# Patient Record
Sex: MALE | NOT HISPANIC OR LATINO | Employment: FULL TIME | URBAN - METROPOLITAN AREA
[De-identification: names, ages, dates, MRNs, and addresses within clinical notes are randomized per-mention and may not be internally consistent; named-entity substitution may affect disease eponyms.]

---

## 2024-07-18 ENCOUNTER — APPOINTMENT (OUTPATIENT)
Dept: CT IMAGING | Facility: CLINIC | Age: 64
DRG: 562 | End: 2024-07-18
Attending: EMERGENCY MEDICINE
Payer: COMMERCIAL

## 2024-07-18 ENCOUNTER — APPOINTMENT (OUTPATIENT)
Dept: GENERAL RADIOLOGY | Facility: CLINIC | Age: 64
DRG: 562 | End: 2024-07-18
Attending: EMERGENCY MEDICINE
Payer: COMMERCIAL

## 2024-07-18 ENCOUNTER — HOSPITAL ENCOUNTER (INPATIENT)
Facility: CLINIC | Age: 64
LOS: 1 days | Discharge: HOME | DRG: 562 | End: 2024-07-19
Attending: EMERGENCY MEDICINE | Admitting: SURGERY
Payer: COMMERCIAL

## 2024-07-18 DIAGNOSIS — S42.032A DISPLACED FRACTURE OF LATERAL END OF LEFT CLAVICLE, INITIAL ENCOUNTER FOR CLOSED FRACTURE: ICD-10-CM

## 2024-07-18 DIAGNOSIS — I60.9 SUBARACHNOID HEMORRHAGE (H): ICD-10-CM
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
## 2024-07-18 LAB
ABO/RH(D): NORMAL
ALBUMIN SERPL BCG-MCNC: 4.8 G/DL (ref 3.5–5.2)
ALP SERPL-CCNC: 87 U/L (ref 40–150)
ALT SERPL W P-5'-P-CCNC: 11 U/L (ref 0–70)
ANION GAP SERPL CALCULATED.3IONS-SCNC: 17 MMOL/L (ref 7–15)
ANTIBODY SCREEN: NEGATIVE
AST SERPL W P-5'-P-CCNC: 22 U/L (ref 0–45)
ATRIAL RATE - MUSE: 109 BPM
BASE EXCESS BLDV CALC-SCNC: -5 MMOL/L (ref -3–3)
BASOPHILS # BLD AUTO: 0.1 10E3/UL (ref 0–0.2)
BASOPHILS NFR BLD AUTO: 0 %
BILIRUB SERPL-MCNC: 0.4 MG/DL
BUN SERPL-MCNC: 9.1 MG/DL (ref 8–23)
CALCIUM SERPL-MCNC: 9.1 MG/DL (ref 8.8–10.4)
CHLORIDE SERPL-SCNC: 94 MMOL/L (ref 98–107)
CREAT SERPL-MCNC: 0.6 MG/DL (ref 0.67–1.17)
DIASTOLIC BLOOD PRESSURE - MUSE: NORMAL MMHG
EGFRCR SERPLBLD CKD-EPI 2021: >90 ML/MIN/1.73M2
EOSINOPHIL # BLD AUTO: 0.1 10E3/UL (ref 0–0.7)
EOSINOPHIL NFR BLD AUTO: 1 %
ERYTHROCYTE [DISTWIDTH] IN BLOOD BY AUTOMATED COUNT: 11.6 % (ref 10–15)
GLUCOSE SERPL-MCNC: 127 MG/DL (ref 70–99)
HCO3 BLDV-SCNC: 21 MMOL/L (ref 21–28)
HCO3 SERPL-SCNC: 22 MMOL/L (ref 22–29)
HCT VFR BLD AUTO: 40.2 % (ref 40–53)
HGB BLD-MCNC: 14.8 G/DL (ref 13.3–17.7)
IMM GRANULOCYTES # BLD: 0.1 10E3/UL
IMM GRANULOCYTES NFR BLD: 1 %
INR PPP: 0.96 (ref 0.85–1.15)
INTERPRETATION ECG - MUSE: NORMAL
LACTATE BLD-SCNC: 4.7 MMOL/L
LACTATE SERPL-SCNC: 1.4 MMOL/L (ref 0.7–2)
LACTATE SERPL-SCNC: 4.4 MMOL/L (ref 0.7–2)
LYMPHOCYTES # BLD AUTO: 2.6 10E3/UL (ref 0.8–5.3)
LYMPHOCYTES NFR BLD AUTO: 15 %
MCH RBC QN AUTO: 32.7 PG (ref 26.5–33)
MCHC RBC AUTO-ENTMCNC: 36.8 G/DL (ref 31.5–36.5)
MCV RBC AUTO: 89 FL (ref 78–100)
MONOCYTES # BLD AUTO: 0.9 10E3/UL (ref 0–1.3)
MONOCYTES NFR BLD AUTO: 5 %
NEUTROPHILS # BLD AUTO: 13.2 10E3/UL (ref 1.6–8.3)
NEUTROPHILS NFR BLD AUTO: 78 %
NRBC # BLD AUTO: 0 10E3/UL
NRBC BLD AUTO-RTO: 0 /100
P AXIS - MUSE: 54 DEGREES
PCO2 BLDV: 40 MM HG (ref 40–50)
PH BLDV: 7.32 [PH] (ref 7.32–7.43)
PLATELET # BLD AUTO: 241 10E3/UL (ref 150–450)
PO2 BLDV: 38 MM HG (ref 25–47)
POTASSIUM SERPL-SCNC: 3.3 MMOL/L (ref 3.4–5.3)
PR INTERVAL - MUSE: 148 MS
PROT SERPL-MCNC: 7.8 G/DL (ref 6.4–8.3)
QRS DURATION - MUSE: 84 MS
QT - MUSE: 340 MS
QTC - MUSE: 457 MS
R AXIS - MUSE: 61 DEGREES
RBC # BLD AUTO: 4.52 10E6/UL (ref 4.4–5.9)
SAO2 % BLDV: 68 % (ref 70–75)
SODIUM SERPL-SCNC: 133 MMOL/L (ref 135–145)
SPECIMEN EXPIRATION DATE: NORMAL
SYSTOLIC BLOOD PRESSURE - MUSE: NORMAL MMHG
T AXIS - MUSE: 46 DEGREES
VENTRICULAR RATE- MUSE: 109 BPM
WBC # BLD AUTO: 17 10E3/UL (ref 4–11)

## 2024-07-18 PROCEDURE — 99207 PR APP CREDIT; MD BILLING SHARED VISIT: CPT | Performed by: PHYSICIAN ASSISTANT

## 2024-07-18 PROCEDURE — 258N000003 HC RX IP 258 OP 636: Performed by: EMERGENCY MEDICINE

## 2024-07-18 PROCEDURE — 83605 ASSAY OF LACTIC ACID: CPT

## 2024-07-18 PROCEDURE — 72125 CT NECK SPINE W/O DYE: CPT

## 2024-07-18 PROCEDURE — 86900 BLOOD TYPING SEROLOGIC ABO: CPT | Performed by: EMERGENCY MEDICINE

## 2024-07-18 PROCEDURE — 120N000001 HC R&B MED SURG/OB

## 2024-07-18 PROCEDURE — 82803 BLOOD GASES ANY COMBINATION: CPT

## 2024-07-18 PROCEDURE — 85610 PROTHROMBIN TIME: CPT | Performed by: EMERGENCY MEDICINE

## 2024-07-18 PROCEDURE — 73060 X-RAY EXAM OF HUMERUS: CPT | Mod: LT

## 2024-07-18 PROCEDURE — 96360 HYDRATION IV INFUSION INIT: CPT | Mod: 59

## 2024-07-18 PROCEDURE — 73030 X-RAY EXAM OF SHOULDER: CPT | Mod: LT

## 2024-07-18 PROCEDURE — 36415 COLL VENOUS BLD VENIPUNCTURE: CPT

## 2024-07-18 PROCEDURE — 87040 BLOOD CULTURE FOR BACTERIA: CPT | Performed by: EMERGENCY MEDICINE

## 2024-07-18 PROCEDURE — 93005 ELECTROCARDIOGRAM TRACING: CPT

## 2024-07-18 PROCEDURE — 80053 COMPREHEN METABOLIC PANEL: CPT | Performed by: EMERGENCY MEDICINE

## 2024-07-18 PROCEDURE — 250N000011 HC RX IP 250 OP 636: Performed by: EMERGENCY MEDICINE

## 2024-07-18 PROCEDURE — 83605 ASSAY OF LACTIC ACID: CPT | Performed by: EMERGENCY MEDICINE

## 2024-07-18 PROCEDURE — 72128 CT CHEST SPINE W/O DYE: CPT

## 2024-07-18 PROCEDURE — 72131 CT LUMBAR SPINE W/O DYE: CPT

## 2024-07-18 PROCEDURE — 99285 EMERGENCY DEPT VISIT HI MDM: CPT | Mod: 25

## 2024-07-18 PROCEDURE — 85025 COMPLETE CBC W/AUTO DIFF WBC: CPT | Performed by: EMERGENCY MEDICINE

## 2024-07-18 PROCEDURE — 250N000009 HC RX 250: Performed by: EMERGENCY MEDICINE

## 2024-07-18 PROCEDURE — 96361 HYDRATE IV INFUSION ADD-ON: CPT

## 2024-07-18 PROCEDURE — 71260 CT THORAX DX C+: CPT

## 2024-07-18 PROCEDURE — 36415 COLL VENOUS BLD VENIPUNCTURE: CPT | Performed by: EMERGENCY MEDICINE

## 2024-07-18 PROCEDURE — 258N000003 HC RX IP 258 OP 636: Performed by: SURGERY

## 2024-07-18 PROCEDURE — 99222 1ST HOSP IP/OBS MODERATE 55: CPT | Mod: FS | Performed by: NEUROLOGICAL SURGERY

## 2024-07-18 PROCEDURE — 71046 X-RAY EXAM CHEST 2 VIEWS: CPT

## 2024-07-18 PROCEDURE — 70450 CT HEAD/BRAIN W/O DYE: CPT

## 2024-07-18 RX ORDER — ONDANSETRON 2 MG/ML
4 INJECTION INTRAMUSCULAR; INTRAVENOUS EVERY 6 HOURS PRN
Status: DISCONTINUED | OUTPATIENT
Start: 2024-07-18 | End: 2024-07-19 | Stop reason: HOSPADM

## 2024-07-18 RX ORDER — OXYCODONE HYDROCHLORIDE 5 MG/1
5 TABLET ORAL EVERY 4 HOURS PRN
Status: DISCONTINUED | OUTPATIENT
Start: 2024-07-18 | End: 2024-07-19 | Stop reason: HOSPADM

## 2024-07-18 RX ORDER — NALOXONE HYDROCHLORIDE 0.4 MG/ML
0.4 INJECTION, SOLUTION INTRAMUSCULAR; INTRAVENOUS; SUBCUTANEOUS
Status: DISCONTINUED | OUTPATIENT
Start: 2024-07-18 | End: 2024-07-19 | Stop reason: HOSPADM

## 2024-07-18 RX ORDER — GLIPIZIDE 10 MG/1
10 TABLET, FILM COATED, EXTENDED RELEASE ORAL DAILY
COMMUNITY

## 2024-07-18 RX ORDER — NALOXONE HYDROCHLORIDE 0.4 MG/ML
0.2 INJECTION, SOLUTION INTRAMUSCULAR; INTRAVENOUS; SUBCUTANEOUS
Status: DISCONTINUED | OUTPATIENT
Start: 2024-07-18 | End: 2024-07-19 | Stop reason: HOSPADM

## 2024-07-18 RX ORDER — OXYCODONE HYDROCHLORIDE 5 MG/1
10 TABLET ORAL EVERY 4 HOURS PRN
Status: DISCONTINUED | OUTPATIENT
Start: 2024-07-18 | End: 2024-07-19 | Stop reason: HOSPADM

## 2024-07-18 RX ORDER — LOSARTAN POTASSIUM 100 MG/1
100 TABLET ORAL DAILY
COMMUNITY

## 2024-07-18 RX ORDER — HYDROMORPHONE HCL IN WATER/PF 6 MG/30 ML
0.2 PATIENT CONTROLLED ANALGESIA SYRINGE INTRAVENOUS
Status: DISCONTINUED | OUTPATIENT
Start: 2024-07-18 | End: 2024-07-19 | Stop reason: HOSPADM

## 2024-07-18 RX ORDER — ACETAMINOPHEN 325 MG/1
650 TABLET ORAL EVERY 4 HOURS PRN
Status: DISCONTINUED | OUTPATIENT
Start: 2024-07-18 | End: 2024-07-19 | Stop reason: HOSPADM

## 2024-07-18 RX ORDER — ONDANSETRON 4 MG/1
4 TABLET, ORALLY DISINTEGRATING ORAL EVERY 6 HOURS PRN
Status: DISCONTINUED | OUTPATIENT
Start: 2024-07-18 | End: 2024-07-19 | Stop reason: HOSPADM

## 2024-07-18 RX ORDER — POLYETHYLENE GLYCOL 3350 17 G/17G
17 POWDER, FOR SOLUTION ORAL DAILY PRN
Status: DISCONTINUED | OUTPATIENT
Start: 2024-07-18 | End: 2024-07-19 | Stop reason: HOSPADM

## 2024-07-18 RX ORDER — IOPAMIDOL 755 MG/ML
73 INJECTION, SOLUTION INTRAVASCULAR ONCE
Status: COMPLETED | OUTPATIENT
Start: 2024-07-18 | End: 2024-07-18

## 2024-07-18 RX ORDER — AMLODIPINE BESYLATE 10 MG/1
10 TABLET ORAL DAILY
COMMUNITY

## 2024-07-18 RX ORDER — SODIUM CHLORIDE 9 MG/ML
1000 INJECTION, SOLUTION INTRAVENOUS CONTINUOUS
Status: DISCONTINUED | OUTPATIENT
Start: 2024-07-18 | End: 2024-07-19 | Stop reason: HOSPADM

## 2024-07-18 RX ORDER — HYDROMORPHONE HCL IN WATER/PF 6 MG/30 ML
0.4 PATIENT CONTROLLED ANALGESIA SYRINGE INTRAVENOUS
Status: DISCONTINUED | OUTPATIENT
Start: 2024-07-18 | End: 2024-07-19 | Stop reason: HOSPADM

## 2024-07-18 RX ORDER — AMOXICILLIN 250 MG
1-2 CAPSULE ORAL 2 TIMES DAILY
Status: DISCONTINUED | OUTPATIENT
Start: 2024-07-18 | End: 2024-07-19 | Stop reason: HOSPADM

## 2024-07-18 RX ORDER — ATORVASTATIN CALCIUM 20 MG/1
20 TABLET, FILM COATED ORAL DAILY
COMMUNITY

## 2024-07-18 RX ADMIN — SODIUM CHLORIDE 62 ML: 9 INJECTION, SOLUTION INTRAVENOUS at 18:22

## 2024-07-18 RX ADMIN — SODIUM CHLORIDE 1000 ML: 9 INJECTION, SOLUTION INTRAVENOUS at 17:07

## 2024-07-18 RX ADMIN — IOPAMIDOL 73 ML: 755 INJECTION, SOLUTION INTRAVENOUS at 18:22

## 2024-07-18 RX ADMIN — SODIUM CHLORIDE 1000 ML: 9 INJECTION, SOLUTION INTRAVENOUS at 18:47

## 2024-07-18 ASSESSMENT — ACTIVITIES OF DAILY LIVING (ADL)
ADLS_ACUITY_SCORE: 35
ADLS_ACUITY_SCORE: 38
ADLS_ACUITY_SCORE: 35
ADLS_ACUITY_SCORE: 35
ADLS_ACUITY_SCORE: 38
ADLS_ACUITY_SCORE: 35

## 2024-07-18 ASSESSMENT — COLUMBIA-SUICIDE SEVERITY RATING SCALE - C-SSRS
1. IN THE PAST MONTH, HAVE YOU WISHED YOU WERE DEAD OR WISHED YOU COULD GO TO SLEEP AND NOT WAKE UP?: NO
2. HAVE YOU ACTUALLY HAD ANY THOUGHTS OF KILLING YOURSELF IN THE PAST MONTH?: NO
6. HAVE YOU EVER DONE ANYTHING, STARTED TO DO ANYTHING, OR PREPARED TO DO ANYTHING TO END YOUR LIFE?: NO

## 2024-07-18 ASSESSMENT — VISUAL ACUITY: OU: NORMAL ACUITY

## 2024-07-18 NOTE — ED NOTES
[facility]  ED Nurse Handoff Report    ED Chief complaint: Fall      ED Diagnosis:   Final diagnoses:   Displaced fracture of lateral end of left clavicle, initial encounter for closed fracture   Subarachnoid hemorrhage (H)   Fall, initial encounter       Code Status: Full Code    Allergies: No Known Allergies    Patient Story: Patient states he fell 10 feet down a ladder. C/o left shoulder pain. C-collar applied in triage.     Focused Assessment:  No neuro deficits. Pt denies headache, only reports left shoulder pain.    Treatments and/or interventions provided: Imaging, IV fluids, labs    Patient's response to treatments and/or interventions: unchanged    CT Cervical Spine w/o Contrast   Final Result   IMPRESSION:   1.  No evidence of acute fracture or subluxation of the cervical spine by CT imaging.      XR Chest 2 Views   Final Result   IMPRESSION: No evidence acute cardiopulmonary disease. No displaced   fractures visualized.      EDMOND HOGAN MD            SYSTEM ID:  [id number]      XR Shoulder Left G/E 3 Views   Final Result   IMPRESSION:   1. There is a fracture of the distal clavicle. There is about 2 cm of   displacement. There is also probably a mild degree of bayoneting.   2. Mild osteoarthrosis of the glenohumeral joint.   3. Otherwise negative.       ASHLEY LINARES MD            SYSTEM ID:  [id number]      Humerus XR,  G/E 2 views, left   Final Result   IMPRESSION: There is a fracture of the distal clavicle. There is 2 cm   displacement. There is also probably mild bayoneting. Mild   osteoarthrosis of the glenohumeral joint. The humerus appears normal.       ASHLEY LINARES MD            SYSTEM ID:  [id number]      CT Head w/o Contrast   Final Result   IMPRESSION:   Small focus of subarachnoid hemorrhage suspected in the   high right frontal lobe as detailed above. No mass effect or midline   shift is resolved. Recommend follow-up CT evaluation of the brain.       [Critical  Result: Acute intracranial hemorrhage]      Finding was identified on 7/18/2024 3:49 PM.       JOHN GREY was contacted by Dr. Ace on 7/18/2024 3:53 PM and   verbalized understanding of the critical result.          YOBANI ACE MD            SYSTEM ID:  [id number]      CT Chest/Abdomen/Pelvis w Contrast    (Results Pending)   CT Thoracic Spine w/o Contrast    (Results Pending)   CT Lumbar Spine w/o Contrast    (Results Pending)     Labs Ordered and Resulted from Time of ED Arrival to Time of ED Departure   COMPREHENSIVE METABOLIC PANEL - Abnormal       Result Value    Sodium 133 (*)     Potassium 3.3 (*)     Carbon Dioxide (CO2) 22      Anion Gap 17 (*)     Urea Nitrogen 9.1      Creatinine 0.60 (*)     GFR Estimate >90      Calcium 9.1      Chloride 94 (*)     Glucose 127 (*)     Alkaline Phosphatase 87      AST 22      ALT 11      Protein Total 7.8      Albumin 4.8      Bilirubin Total 0.4     CBC WITH PLATELETS AND DIFFERENTIAL - Abnormal    WBC Count 17.0 (*)     RBC Count 4.52      Hemoglobin 14.8      Hematocrit 40.2      MCV 89      MCH 32.7      MCHC 36.8 (*)     RDW 11.6      Platelet Count 241      % Neutrophils 78      % Lymphocytes 15      % Monocytes 5      % Eosinophils 1      % Basophils 0      % Immature Granulocytes 1      NRBCs per 100 WBC 0      Absolute Neutrophils 13.2 (*)     Absolute Lymphocytes 2.6      Absolute Monocytes 0.9      Absolute Eosinophils 0.1      Absolute Basophils 0.1      Absolute Immature Granulocytes 0.1      Absolute NRBCs 0.0     ISTAT GASES LACTATE VENOUS POCT - Abnormal    Lactic Acid POCT 4.7 (*)     Bicarbonate Venous POCT 21      O2 Sat, Venous POCT 68 (*)     pCO2 Venous POCT 40      pH Venous POCT 7.32      pO2 Venous POCT 38      Base Excess/Deficit (+/-) POCT -5.0 (*)    INR - Normal    INR 0.96     TYPE AND SCREEN, ADULT    ABO/RH(D) B POS      Antibody Screen Negative      SPECIMEN EXPIRATION DATE [id number]     BLOOD CULTURE   BLOOD CULTURE   ABO/RH  "TYPE AND SCREEN         To be done/followed up on inpatient unit:  follow admitting MD orders. Keep HOB 30 degrees. Fall precautions.    Does this patient have any cognitive concerns?:  none    Activity level - Baseline/Home:  Independent  Activity Level - Current:    up ad sondra    Patient's Preferred language: [place] citizen   Needed?: No    Isolation: None  Infection: Not Applicable  Patient tested for COVID 19 prior to admission: NO  Bariatric?: No    Vital Signs:   Vitals:    07/18/24 1630 07/18/24 1700 07/18/24 1708 07/18/24 1730   BP: (!) 142/84 (!) 149/87  (!) 142/87   Pulse: 92 98  101   Resp: 17 16  18   Temp:       TempSrc:       SpO2: 99% 97%     Weight:   65.8 kg (145 lb)    Height:   1.702 m (5' 7\")        Cardiac Rhythm:   No results found for this or any previous visit.    Was the PSS-3 completed:   Yes  What interventions are required if any?               Family Comments: spouse supportive at bedside, she is primarily [place] citizen speaking but pt speaks English well    For the majority of the shift this patient's behavior was Green.     ED NURSE PHONE NUMBER: [phone])          "

## 2024-07-18 NOTE — PHARMACY-ADMISSION MEDICATION HISTORY
Pharmacist Admission Medication History    Admission medication history is complete. The information provided in this note is only as accurate as the sources available at the time of the update.    Information Source(s): Family member and office visit via in-person    Pertinent Information:     Changes made to PTA medication list:  Added: None  Deleted: None  Changed: None    Allergies reviewed with patient and updates made in EHR: no    Medication History Completed By: Stephanie Vázquez RPH 7/18/2024 6:40 PM    PTA Med List   Medication Sig Last Dose    amLODIPine (NORVASC) 10 MG tablet Take 10 mg by mouth daily 7/17/2024    atorvastatin (LIPITOR) 20 MG tablet Take 20 mg by mouth daily 7/17/2024    glipiZIDE (GLUCOTROL XL) 10 MG 24 hr tablet Take 10 mg by mouth daily 7/17/2024    losartan (COZAAR) 100 MG tablet Take 100 mg by mouth daily 7/17/2024    metFORMIN (GLUCOPHAGE) 1000 MG tablet Take 1,000 mg by mouth 2 times daily (with meals) 7/17/2024     Stephanie Vázquez PharmD

## 2024-07-18 NOTE — ED PROVIDER NOTES
"  Emergency Department Note      History of Present Illness     Chief Complaint   Myles Ojeda is a 64 year old male with history of hyperlipidemia and type II diabetes who presents for evaluation after a fall. The patient reports that he was at the top of a 10 ft standing ladder cutting branches from a tree when he fell to the ground and landed on his left shoulder. States that the ladder did not land on top of him. He endorses pain mostly in the posterior left shoulder. He denies head trauma, loss of consciousness, cervical, thoracic, or lumbar spine tenderness, and loss of consciousness. He denies other trauma or injuries from the fall.     Independent Historian   None    Review of External Notes   MIIC shows Tdap booster given in 2024.    Past Medical History     Medical History and Problem List   GERD  Hyperlipidemia  Type II diabetes    Medications   Amlodipine  Atorvastatin  Glipizide  Losartan   Metformin    Physical Exam     Patient Vitals for the past 24 hrs:   BP Temp Temp src Pulse Resp SpO2 Height Weight   07/18/24 1930 (!) 142/80 -- -- 116 18 96 % -- --   07/18/24 1900 137/83 -- -- 115 17 96 % -- --   07/18/24 1853 134/86 -- -- 111 -- 96 % -- --   07/18/24 1800 (!) 147/81 -- -- 104 19 -- -- --   07/18/24 1730 (!) 142/87 -- -- 101 18 -- -- --   07/18/24 1708 -- -- -- -- -- -- 1.702 m (5' 7\") 65.8 kg (145 lb)   07/18/24 1700 (!) 149/87 -- -- 98 16 97 % -- --   07/18/24 1630 (!) 142/84 -- -- 92 17 99 % -- --   07/18/24 1515 119/66 98  F (36.7  C) Temporal 83 18 99 % -- --     Physical Exam  General: Patient is alert and normal appearing.  HEENT: Head atraumatic    Eyes: pupils equal and reactive. Conjunctiva clear   Nares: patent   Oropharynx: no lesions, uvula midline, no palatal draping, normal voice, no trismus  Neck: Supple without lymphadenopathy, no meningismus  Chest: Heart regular rate and rhythm.   Lungs: Equal clear to auscultation with no wheeze or rales  Abdomen: Soft, non tender, " nondistended, normal bowel sounds  Back: No costovertebral angle tenderness, no midline C, T or L spine tenderness  Neuro: Grossly nonfocal, normal speech, strength equal bilaterally, CN 2-12 intact  Extremities: Shoulder with obvious deformity at the distal clavicle.  Skin abrasions noted to the left arm.  Equal radial and DP pulses. No clubbing, cyanosis.  No edema  Skin: Warm and dry with no rash.       Diagnostics     Lab Results   Labs Ordered and Resulted from Time of ED Arrival to Time of ED Departure   COMPREHENSIVE METABOLIC PANEL - Abnormal       Result Value    Sodium 133 (*)     Potassium 3.3 (*)     Carbon Dioxide (CO2) 22      Anion Gap 17 (*)     Urea Nitrogen 9.1      Creatinine 0.60 (*)     GFR Estimate >90      Calcium 9.1      Chloride 94 (*)     Glucose 127 (*)     Alkaline Phosphatase 87      AST 22      ALT 11      Protein Total 7.8      Albumin 4.8      Bilirubin Total 0.4     CBC WITH PLATELETS AND DIFFERENTIAL - Abnormal    WBC Count 17.0 (*)     RBC Count 4.52      Hemoglobin 14.8      Hematocrit 40.2      MCV 89      MCH 32.7      MCHC 36.8 (*)     RDW 11.6      Platelet Count 241      % Neutrophils 78      % Lymphocytes 15      % Monocytes 5      % Eosinophils 1      % Basophils 0      % Immature Granulocytes 1      NRBCs per 100 WBC 0      Absolute Neutrophils 13.2 (*)     Absolute Lymphocytes 2.6      Absolute Monocytes 0.9      Absolute Eosinophils 0.1      Absolute Basophils 0.1      Absolute Immature Granulocytes 0.1      Absolute NRBCs 0.0     ISTAT GASES LACTATE VENOUS POCT - Abnormal    Lactic Acid POCT 4.7 (*)     Bicarbonate Venous POCT 21      O2 Sat, Venous POCT 68 (*)     pCO2 Venous POCT 40      pH Venous POCT 7.32      pO2 Venous POCT 38      Base Excess/Deficit (+/-) POCT -5.0 (*)    LACTIC ACID WHOLE BLOOD - Abnormal    Lactic Acid 4.4 (*)    INR - Normal    INR 0.96     LACTIC ACID WHOLE BLOOD   TYPE AND SCREEN, ADULT    ABO/RH(D) B POS      Antibody Screen Negative       SPECIMEN EXPIRATION DATE [id number]     BLOOD CULTURE   BLOOD CULTURE   ABO/RH TYPE AND SCREEN       Imaging   CT Chest/Abdomen/Pelvis w Contrast   Final Result   IMPRESSION:   1.  No acute findings in the chest, abdomen, and pelvis. No traumatic injury.      CT Lumbar Spine w/o Contrast   Final Result    IMPRESSION:   1.  Right L3 pars age-indeterminate fracture.      2.  Otherwise, no acute fracture.       3.  Chronic spine findings described above.      CT Thoracic Spine w/o Contrast   Final Result     IMPRESSION:   1.  No acute fracture.      2.  Chronic spine changes described above.      3.  Right thyroid nodule. Recommend nonemergent thyroid ultrasound.         CT Cervical Spine w/o Contrast   Final Result   IMPRESSION:   1.  No evidence of acute fracture or subluxation of the cervical spine by CT imaging.      XR Chest 2 Views   Final Result   IMPRESSION: No evidence acute cardiopulmonary disease. No displaced   fractures visualized.      EDMOND HOGAN MD            SYSTEM ID:  [id number]      XR Shoulder Left G/E 3 Views   Final Result   IMPRESSION:   1. There is a fracture of the distal clavicle. There is about 2 cm of   displacement. There is also probably a mild degree of bayoneting.   2. Mild osteoarthrosis of the glenohumeral joint.   3. Otherwise negative.       ASHLEY LINARES MD            SYSTEM ID:  [id number]      Humerus XR,  G/E 2 views, left   Final Result   IMPRESSION: There is a fracture of the distal clavicle. There is 2 cm   displacement. There is also probably mild bayoneting. Mild   osteoarthrosis of the glenohumeral joint. The humerus appears normal.       ASHLEY LINARES MD            SYSTEM ID:  [id number]      CT Head w/o Contrast   Final Result   IMPRESSION:   Small focus of subarachnoid hemorrhage suspected in the   high right frontal lobe as detailed above. No mass effect or midline   shift is resolved. Recommend follow-up CT evaluation of the brain.       [Critical  Result: Acute intracranial hemorrhage]      Finding was identified on 7/18/2024 3:49 PM.       JOHN GREY was contacted by Dr. Ace on 7/18/2024 3:53 PM and   verbalized understanding of the critical result.          YOBANI ACE MD            SYSTEM ID:  [id number]        Independent Interpretation   Head CT with small subarachnoid hemorrhage noted  Chest x-ray with no pneumothorax or infiltrate or effusion    ECG  ECG taken at 1851, ECG read at 1900  Sinus tachycardia  Nonspecific ST and T wave abnormality    Rate 109 bpm. WI interval 148 ms. QRS duration 84 ms. QT/QTc 340/457 ms. P-R-T axes 54 61 46.     ED Course      Medications Administered   Medications   sodium chloride 0.9 % infusion (1,000 mLs Intravenous $New Bag 7/18/24 1847)   sodium chloride 0.9% BOLUS 1,000 mL (0 mLs Intravenous Stopped 7/18/24 1845)   iopamidol (ISOVUE-370) solution 73 mL (73 mLs Intravenous $Given 7/18/24 1822)   sodium chloride 0.9 % bag 500 mL for CT scan flush use (62 mLs Intravenous $Given 7/18/24 1822)     Procedures   Procedures     Discussion of Management   Admitting Hospitalist, Tyler Iraheta  Neurosurgery, PA on call    ED Course   ED Course as of 07/18/24 1913   Thu Jul 18, 2024   1517 I obtained history and examined the patient as noted above.    1553 I spoke with radiology regarding CT head result of acute subarachnoid hemorrhage.   1649 I spoke with APC from neurosurgery.    1650 I rechecked and updated the patient.    1657 Lactic Acid POCT(!!): 4.7   1705 I spoke with Dr. Scott from general surgery.   1734 I spoke with Dr. March of the hospitalist service regarding admission.  She requests general surgery trauma admission   1853 I rechecked and updated the patient. Patient urinated 900 ml without pain. He denies all pain other than mild left shoulder pain and does not want any pain medications.       Optional/Additional Documentation  None    Medical Decision Making / Diagnosis     CMS Diagnoses: The  Lactic acid level is elevated due to trauma, at this time there is no sign of severe sepsis or septic shock.    MIPS       None    MDM   Ambrocio Rusty is a 64 year old male presents emergency department after falling down a ladder from the top rung.  He states he got to about the third rung and then fell off the ladder.  Patient denies loss of consciousness.  Not on anticoagulation.  Workup in the emergency department revealed an obvious deformity to his left shoulder.  X-ray reveals a distal clavicle fracture with significant displacement that will need orthopedic consultation.  Patient also had a head CT was subarachnoid hemorrhage.  C-spine CT with no acute fracture.  CT chest abdomen pelvis given elevated lactic acid and tachycardia that developed while here in the emergency department negative for new injury.  CT L-spine with right L3 pars aged indeterminate fracture.  He has no low back pain.  He has no numbness or weakness into his legs.  Patient denies significant pain.  He had bladder distention on his CT but had 900 cc of output.  He declines pain medication at this time and does not have significant pain.  PA for neurosurgery evaluated the patient and recommend repeat head CT in the morning.  Given 2 system injury in the setting of fall from height patient will be admitted to the general surgery trauma service.  Patient is agreeable with the plan for admission.  Continue to observe in the emergency department.  Received a liter of IV fluids.  No recent infectious symptoms.  Afebrile here.  Suspect his lactic acidosis and leukocytosis is from the setting of trauma.  Will continue to trend this.  Declines pain medications at this time.  All patient questions and concerns addressed.        Critical Care time was 25 minutes for this patient excluding procedures.    Disposition   The patient was admitted to the hospital.     Diagnosis     ICD-10-CM    1. Displaced fracture of lateral end of left clavicle, initial  encounter for closed fracture  S42.032A       2. Subarachnoid hemorrhage (H)  I60.9       3. Fall, initial encounter  W19.XXXA          Scribe Disclosure:  I, Mamta Izaguirre, am serving as a scribe at 5:35 PM on 7/18/2024 to document services personally performed by Ina Estrada MD based on my observations and the provider's statements to me.        Ina Estrada MD  07/18/24 2010

## 2024-07-18 NOTE — ED TRIAGE NOTES
Patient states he fell 10 feet down a ladder. C/o left shoulder pain. C-collar applied in triage.      Triage Assessment (Adult)       Row Name 07/18/24 1515          Triage Assessment    Airway WDL WDL        Respiratory WDL    Respiratory WDL WDL        Skin Circulation/Temperature WDL    Skin Circulation/Temperature WDL WDL        Cardiac WDL    Cardiac WDL WDL        Peripheral/Neurovascular WDL    Peripheral Neurovascular WDL WDL

## 2024-07-18 NOTE — CONSULTS
[facility]    Neurosurgery Consultation     Date of Admission:  7/18/2024  Date of Consult (When I saw the patient): 07/18/24    Assessment & Plan   Ambrocio Ojeda is a 64 year old male who was admitted on 7/18/2024. Ambrocio Ojeda is a 64 year old male NOT anticoagulated who presents after fall from 10 ft ladder witih shoulder pain and head pain and imaging evidence small focus SAH high right frontal lobe.    Patient denies LOC, seizure activity. Denies current headache, n/v, weakness, bowel/bladder changes. Main complaint left shoulder pain.    CT SCAN OF THE HEAD WITHOUT CONTRAST   7/18/2024 3:47 PM                                                           IMPRESSION:   Small focus of subarachnoid hemorrhage suspected in the  high right frontal lobe as detailed above. No mass effect or midline  shift is resolved. Recommend follow-up CT evaluation of the brain.      [Critical Result: Acute intracranial hemorrhage]     Finding was identified on 7/18/2024 3:49 PM.      JOHN ESTRADA was contacted by Dr. Ace on 7/18/2024 3:53 PM and  verbalized understanding of the critical result.         YOBANI ACE MD     Clinical history, imaging and plans reviewed myself as well as with Dr. Garza.    PLAN:   7th floor with 4H neuro checks  HOB30  SBP<150  Rpt head CT tomorrow AM     If stable, follow up 1 month with head CT    I have discussed the following assessment and plan with Dr. Ramos who is in agreement with the initial plan and will follow up with further consultation recommendations.    YANA Nagy [facility]  [address]  [address] [place] [id number]  Tel [phone]  Fax [phone]  Text page via [employer] Paging/Directory      Code Status    No Order    Reason for Consult   Reason for consult: I was asked by dr. Esrtada to evaluate this patient for SAH.    Primary Care Physician   Physician No Ref-Primary    Chief Complaint   Falln     History is obtained from  the patient, electronic health record, and emergency department physician    History of Present Illness   Ambrocio Ojeda is a 64 year old male NOT anticoagulated who presents after fall from 10 ft ladder witih shoulder pain and head pain and imaging evidence small focus SAH high right frontal lobe.    Patient denies LOC, seizure activity. Denies current headache, n/v, weakness, bowel/bladder changes. Main complaint left shoulder pain.    CT SCAN OF THE HEAD WITHOUT CONTRAST   7/18/2024 3:47 PM                                                           IMPRESSION:   Small focus of subarachnoid hemorrhage suspected in the  high right frontal lobe as detailed above. No mass effect or midline  shift is resolved. Recommend follow-up CT evaluation of the brain.      [Critical Result: Acute intracranial hemorrhage]     Finding was identified on 7/18/2024 3:49 PM.      JOHN GREY was contacted by Dr. Ace on 7/18/2024 3:53 PM and  verbalized understanding of the critical result.         YOBANI ACE MD      Past Medical History   I have reviewed this patient's medical history and updated it with pertinent information if needed.   No past medical history on file.    Past Surgical History   I have reviewed this patient's surgical history and updated it with pertinent information if needed.  No past surgical history on file.    Prior to Admission Medications   None     Allergies   No Known Allergies    Social History   I have reviewed this patient's social history and updated it with pertinent information if needed. Ambrocio Ojeda      Family History   I have reviewed this patient's family history and updated it with pertinent information if needed.   No family history on file.    Review of Systems  ROS: 10 point ROS neg other than the symptoms noted above in the HPI.      Physical Exam   Temp: 98  F (36.7  C) Temp src: Temporal BP: (!) 142/84 Pulse: 92   Resp: 17 SpO2: 99 % O2 Device: None (Room air)    Vital Signs with  Ranges  Temp:  [98  F (36.7  C)] 98  F (36.7  C)  Pulse:  [83-92] 92  Resp:  [17-18] 17  BP: (119-142)/(66-84) 142/84  SpO2:  [99 %] 99 %  0 lbs 0 oz     , Blood pressure (!) 142/84, pulse 92, temperature 98  F (36.7  C), temperature source Temporal, resp. rate 17, SpO2 99%.  0 lbs 0 oz  NEUROLOGICAL EXAMINATION:   Mental status:  Alert and Oriented x 3, speech is fluent.  Cranial nerves:  II-XII intact.   Motor:  Strength is 5/5 throughout the upper and lower extremities  Shoulder Abduction:  Right:  5/5   Left:  5/5  Biceps:                      Right:  5/5   Left:  5/5  Triceps:                     Right:  5/5   Left:  5/5  Wrist Extensors:       Right:  5/5   Left:  5/5  Wrist Flexors:           Right:  5/5   Left:  5/5  interosseus :            Right:  5/5   Left:  5/5   Hip Flexor:                Right: 5/5  Left:  5/5  Hip Adductor:             Right:  5/5  Left:  5/5  Hip Abductor:             Right:  5/5  Left:  5/5  Gastroc Soleus:        Right:  5/5  Left:  5/5  Tib/Ant:                      Right:  5/5  Left:  5/5  EHL:                     Right:  5/5  Left:  5/5  Sensation:  intact  Reflexes:    Negative Clonus.    Coordination:  Smooth finger to nose testing.   Negative pronator drift.      Data   All new lab and imaging data was personally reviewed by me.  CT SCAN OF THE HEAD WITHOUT CONTRAST   7/18/2024 3:47 PM      HISTORY: fall down ladder     TECHNIQUE:  Axial images of the head and coronal reformations without  IV contrast material. Radiation dose for this scan was reduced using  automated exposure control, adjustment of the mA and/or kV according  to patient size, or iterative reconstruction technique.     COMPARISON: None.     FINDINGS: Small focus of subarachnoid hemorrhage questionably present  in the high right frontal lobe. Recommend follow-up imaging to assess  for interval change. This is best visualized on image 22 of series 2  and image 24 of series 5. No midline shift or mass effect.  "Bilateral  basal ganglia mineralization. Intracranial atheromatous disease. No  acute loss of gray-white differentiation. Mild global cortical volume  loss. Osseous calvarium is intact.                                                                      IMPRESSION:   Small focus of subarachnoid hemorrhage suspected in the  high right frontal lobe as detailed above. No mass effect or midline  shift is resolved. Recommend follow-up CT evaluation of the brain.      [Critical Result: Acute intracranial hemorrhage]     Finding was identified on 7/18/2024 3:49 PM.      JOHN GREY was contacted by Dr. Ace on 7/18/2024 3:53 PM and  verbalized understanding of the critical result.         YOBANI ACE MD      CBC RESULTS:   Recent Labs   Lab Test 07/18/24  1632   WBC 17.0*   RBC 4.52   HGB 14.8   HCT 40.2   MCV 89   MCH 32.7   MCHC 36.8*   RDW 11.6        Basic Metabolic Panel:  No results found for: \"NA\"   No results found for: \"POTASSIUM\"  No results found for: \"CHLORIDE\"  No results found for: \"PASTORA\"  No results found for: \"CO2\"  No results found for: \"BUN\"  No results found for: \"CR\"  No results found for: \"GLC\"  INR:  Lab Results   Component Value Date    INR 0.96 07/18/2024         "

## 2024-07-19 ENCOUNTER — APPOINTMENT (OUTPATIENT)
Dept: CT IMAGING | Facility: CLINIC | Age: 64
DRG: 562 | End: 2024-07-19
Attending: PHYSICIAN ASSISTANT
Payer: COMMERCIAL

## 2024-07-19 ENCOUNTER — APPOINTMENT (OUTPATIENT)
Dept: SPEECH THERAPY | Facility: CLINIC | Age: 64
DRG: 562 | End: 2024-07-19
Attending: PHYSICIAN ASSISTANT
Payer: COMMERCIAL

## 2024-07-19 VITALS
RESPIRATION RATE: 18 BRPM | BODY MASS INDEX: 23.04 KG/M2 | HEART RATE: 96 BPM | OXYGEN SATURATION: 96 % | HEIGHT: 67 IN | DIASTOLIC BLOOD PRESSURE: 91 MMHG | TEMPERATURE: 98.5 F | WEIGHT: 146.83 LBS | SYSTOLIC BLOOD PRESSURE: 159 MMHG

## 2024-07-19 LAB
ANION GAP SERPL CALCULATED.3IONS-SCNC: 11 MMOL/L (ref 7–15)
BUN SERPL-MCNC: 4.8 MG/DL (ref 8–23)
CALCIUM SERPL-MCNC: 8.6 MG/DL (ref 8.8–10.4)
CHLORIDE SERPL-SCNC: 100 MMOL/L (ref 98–107)
CREAT SERPL-MCNC: 0.49 MG/DL (ref 0.67–1.17)
EGFRCR SERPLBLD CKD-EPI 2021: >90 ML/MIN/1.73M2
ERYTHROCYTE [DISTWIDTH] IN BLOOD BY AUTOMATED COUNT: 11.6 % (ref 10–15)
GLUCOSE SERPL-MCNC: 155 MG/DL (ref 70–99)
HCO3 SERPL-SCNC: 22 MMOL/L (ref 22–29)
HCT VFR BLD AUTO: 39.1 % (ref 40–53)
HGB BLD-MCNC: 14.4 G/DL (ref 13.3–17.7)
MCH RBC QN AUTO: 32.4 PG (ref 26.5–33)
MCHC RBC AUTO-ENTMCNC: 36.8 G/DL (ref 31.5–36.5)
MCV RBC AUTO: 88 FL (ref 78–100)
PLATELET # BLD AUTO: 224 10E3/UL (ref 150–450)
POTASSIUM SERPL-SCNC: 3.5 MMOL/L (ref 3.4–5.3)
RBC # BLD AUTO: 4.44 10E6/UL (ref 4.4–5.9)
SODIUM SERPL-SCNC: 133 MMOL/L (ref 135–145)
WBC # BLD AUTO: 11.9 10E3/UL (ref 4–11)

## 2024-07-19 PROCEDURE — 80048 BASIC METABOLIC PNL TOTAL CA: CPT | Performed by: SURGERY

## 2024-07-19 PROCEDURE — 99231 SBSQ HOSP IP/OBS SF/LOW 25: CPT | Performed by: PHYSICIAN ASSISTANT

## 2024-07-19 PROCEDURE — 250N000011 HC RX IP 250 OP 636: Performed by: SURGERY

## 2024-07-19 PROCEDURE — 250N000013 HC RX MED GY IP 250 OP 250 PS 637: Performed by: SURGERY

## 2024-07-19 PROCEDURE — 92610 EVALUATE SWALLOWING FUNCTION: CPT | Mod: GN

## 2024-07-19 PROCEDURE — 70450 CT HEAD/BRAIN W/O DYE: CPT

## 2024-07-19 PROCEDURE — 258N000003 HC RX IP 258 OP 636: Performed by: SURGERY

## 2024-07-19 PROCEDURE — 85041 AUTOMATED RBC COUNT: CPT | Performed by: SURGERY

## 2024-07-19 PROCEDURE — 36415 COLL VENOUS BLD VENIPUNCTURE: CPT | Performed by: SURGERY

## 2024-07-19 RX ADMIN — SODIUM CHLORIDE 1000 ML: 9 INJECTION, SOLUTION INTRAVENOUS at 04:03

## 2024-07-19 RX ADMIN — HYDROMORPHONE HYDROCHLORIDE 0.2 MG: 0.2 INJECTION, SOLUTION INTRAMUSCULAR; INTRAVENOUS; SUBCUTANEOUS at 08:45

## 2024-07-19 RX ADMIN — ACETAMINOPHEN 650 MG: 325 TABLET, FILM COATED ORAL at 11:50

## 2024-07-19 ASSESSMENT — ACTIVITIES OF DAILY LIVING (ADL)
ADLS_ACUITY_SCORE: 38
ADLS_ACUITY_SCORE: 21

## 2024-07-19 NOTE — PROGRESS NOTES
RECEIVING UNIT ED HANDOFF REVIEW    ED Nurse Handoff Report was reviewed by: Odalys Chowdhury RN on July 18, 2024 at 8:35 PM

## 2024-07-19 NOTE — DISCHARGE SUMMARY
[facility] Discharge Summary    Ambrocio Ojeda MRN# [id number]   Age: 64 year old YOB: 1960     Date of Admission:  7/18/2024  Date of Discharge::  7/19/2024  Admitting Physician:  Charlie Scott MD  Discharge Physician:  Aline Garcia DO          Admission Diagnoses:   Subarachnoid hemorrhage (H) [I60.9]  Fall, initial encounter [W19.XXXA]  Displaced fracture of lateral end of left clavicle, initial encounter for closed fracture [S42.032A]          Discharge Diagnosis:     Fall  Left clavicle fracture          Procedures:     Procedure(s): none                Medications Prior to Admission:     Medications Prior to Admission   Medication Sig Dispense Refill Last Dose    amLODIPine (NORVASC) 10 MG tablet Take 10 mg by mouth daily   7/17/2024    atorvastatin (LIPITOR) 20 MG tablet Take 20 mg by mouth daily   7/17/2024    glipiZIDE (GLUCOTROL XL) 10 MG 24 hr tablet Take 10 mg by mouth daily   7/17/2024    losartan (COZAAR) 100 MG tablet Take 100 mg by mouth daily   7/17/2024    metFORMIN (GLUCOPHAGE) 1000 MG tablet Take 1,000 mg by mouth 2 times daily (with meals)   7/17/2024             Discharge Medications:     Current Discharge Medication List        CONTINUE these medications which have NOT CHANGED    Details   amLODIPine (NORVASC) 10 MG tablet Take 10 mg by mouth daily      atorvastatin (LIPITOR) 20 MG tablet Take 20 mg by mouth daily      glipiZIDE (GLUCOTROL XL) 10 MG 24 hr tablet Take 10 mg by mouth daily      losartan (COZAAR) 100 MG tablet Take 100 mg by mouth daily      metFORMIN (GLUCOPHAGE) 1000 MG tablet Take 1,000 mg by mouth 2 times daily (with meals)                   Consultations:   Orthopedic Surgery: sling for comfort, no lifting more than a cup of coffee. Follow-up outpatient  Neurosurgery: no follow-up needed          Brief History of Illness:   64 year old male with PMHx HLD, T2DM who presented after fall off of 10 ft. Ladder while trimming a tree.  He reports  he fell on his left side and hit his shoulder.  He did not hit his head or lose consciousness.  Patient was carnes-scanned in the ED and was found to have a left clavicle fracture.  Initial head CT called a small focus of SAH in high right frontal lobe, but on repeat head CT, it was thought this was a cortical vein instead.             Hospital Course:   The patient's hospital course was unremarkable.  He is neurointact and is ambulating independently.  He is tolerating a regular diet without issue.          Discharge Instructions and Follow-Up:     Discharge diet: Regular   Discharge activity: Activity as tolerated  No lifting more than a full coffee cup with the left arm  Avoid left shoulder range of motion   Discharge follow-up: Follow up with PCP in 1-2 weeks to discuss further workup of thyroid nodule incidentally found. Follow up with orthopedics team in 2 weeks.   Wound care: none           Discharge Disposition:     Discharged to home      Patient seen and discussed with Dr. Xu Garcia, DO

## 2024-07-19 NOTE — UTILIZATION REVIEW
Admission Status; Secondary Review Determination    Under the authority of the Utilization Management Committee, the utilization review process indicated a secondary review on the above patient. The review outcome is based on review of the medical records, discussions with staff, and applying clinical experience noted on the date of the review.    (x) Inpatient Status Appropriate - This patient's medical care is consistent with medical management for inpatient care and reasonable inpatient medical practice.    RATIONALE FOR DETERMINATION: 64-year-old male with history of type 2 diabetes fell from the top of a 10 foot ladder while trimming branches landing on his left shoulder.  Patient suffered a distal clavicular fracture with 2 cm of displacement with significant skin tenting but no skin breakage.  Furthermore CT imaging of the head in the emergency room revealed a small focus of subarachnoid hemorrhage suspected in the high right frontal lobe.  Orthopedic surgery feels patient will require surgical intervention but defers timing to neurosurgery due to the initial diagnosis of subarachnoid hemorrhage.  Patient thus expected greater than 2 nights in the hospital and requiring IV narcotics on hospital day 2.    At the time of admission with the information available to the attending physician more than 2 nights Hospital complex care was anticipated, based on patient risk of adverse outcome if treated as outpatient and complex care required. Inpatient admission is appropriate based on the Medicare guidelines.    This document was produced using voice recognition software    The information on this document is developed by the utilization review team in order for the business office to ensure compliance. This only denotes the appropriateness of proper admission status and does not reflect the quality of care rendered.    The definitions of Inpatient Status and Observation Status used in making the determination  above are those provided in the CMS Coverage Manual, Chapter 1 and Chapter 6, section 70.4.    Sincerely,    Oliver Jett MD  Utilization Review  Physician Advisor  [facility].

## 2024-07-19 NOTE — CONSULTS
[facility]    Orthopedic Consultation    Ambrocio Ojeda MRN# [id number]   Age: 64 year old YOB: 1960     Date of Admission: 7/18/2024    Reason for consult: Left clavicle fracture       Requesting physician: Charlie Scott MD       Level of consult: Consult, follow and place orders           Assessment and Plan:   Assessment:   Acute, closed, displaced, mildly bayoneted left distal clavicle fracture  Acute left coracoclavicular ligament tear  Left upper arm abrasion  History of left index trigger finger  Acute subarachnoid hemorrhage      Plan:   The patient's history and clinical/diagnostic findings were reviewed with the on-call orthopedic trauma surgeon, Dr. Burton Vega. Patient sustained a fall of approximately 10 feet from a ladder on 7/18/24 that resulted in a subarachnoid hemorrhage (managed by neurosurgery), left distal clavicle fracture, and left coracoclavicular ligament tear. The left shoulder injury is closed. Per the patient's radiographs on 7/18/24, he did have a fair amount of skin tenting, but on today's exam there is just minimal skin tenting/step off. No open wounds to the clavicular region though he does have left upper arm abrasions. MARY PONCE. Discussed with patient that surgical intervention is recommended as his fracture is unstable given associated ligament injury and there is risk for skin compromise that could result in an open fracture and increased risk of infection. Also reviewed that surgery will maximize his function and will allow for a more predictable level of pain improvement. Patient wants to contemplate surgery further and discuss recommendations with his wife. We reviewed that ideally this procedure would be completed within the next week, possibly on 7/22/24 by Dr. Vega if he remains inpatient. Should the patient discharge prior to then, he would need to follow up with a shoulder specialist on an outpatient basis.     I have a message out to Shweta  "YANA Moncada with preferred timeline for orthopedic surgery and any intraoperative precaution recommendations (ie. HOB, BP goals, etc.) in the setting of SAH. Patient is undergoing a repeat head CT this morning.    In the interim, recommendations as follows:  -Sling for comfort. Okay to remove for hygiene, skin checks, to apply cold compresses, and to complete ROM as below.   -No lifting more than a full coffee cup with the LUE. No use of standard or platform walker.   -Avoid left shoulder ROM as able. Encourage frequent ROM of the elbow, wrist, and digits to prevent stiffness.  -Pain regimen per medicine team. Encourage use of cold compresses.  -NV checks Q4H LUE.  -Ortho will check in again tomorrow and follow discharge timeline updates. Potential OR on 7/22/24 while inpatient if not yet discharged vs hopefully next week with a shoulder specialist if cleared by neurosurgery.    Additionally, patient has a ring on his left ring finger. This should be observed by nursing staff and if there is any increased edema to the left hand, ring needs to be removed. Order placed.    Please contact orthopedic trauma team if any questions or concerns arise.           Chief Complaint:   Left clavicle fracture         History of Present Illness:   Medical history obtained via chart review and discussion with the patient. Ambrocio Ojeda is a 64 year old right-hand dominant male with past medical history of T2DM, HTN, and chronic back pain who was admitted on 7/18/24 for a subarachnoid hemorrhage and left clavicle fracture. On 7/18/24, the patient fell approximately 10 feet from a ladder while trimming branches onto his left side. He claims that he did not hit his head and denies LOC. Patient noted immediate onset of pain to the left shoulder region. He also states that the area had a \"large bump\" but denies any open wounds to the clavicle. He did sustained some abrasions to the left upper arm. The patient was able to get up on his own " "after the incident. Patient was taken to [facility] ED where head CT was consistent with a small SAH of the high right frontal lobe and radiographs of the left shoulder demonstrated a distal clavicle fracture with evidence of CC ligament injury. No obvious left humerus fractures. Patient admitted to the neuro floor for monitoring and repeat head CT today. Currently, the patient reports moderate pain to the left clavicle region. Denies pain to the remainder of the left arm. He states that the bump to his left clavicular area has \"gone way down\" since yesterday. Denies numbness and tingling of the LUE. Denies prior injuries or surgeries to the left shoulder. He has a left index trigger finger for which he wears an oval 8 splint. 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 Denies the use of ambulatory assistive devices at baseline. No PTA anticoagulants or ASA use. No prior anesthesia or sedation per his report. No recent illnesses. Tolerating PO intake.          Past Medical History:   No past medical history on file.          Past Surgical History:   No past surgical history on file.          Social History:     Social History     Tobacco Use    Smoking status: Not on file    Smokeless tobacco: Not on file   Substance Use Topics    Alcohol use: Not on file             Family History:   No family history on file.          Immunizations:     VACCINE/DOSE   Diptheria   DPT   DTAP   HBIG   Hepatitis A   Hepatitis B   HIB   Influenza   Measles   Meningococcal   MMR   Mumps   Pneumococcal   Polio   Rubella   Small Pox   TDAP   Varicella   Zoster             Allergies:   No Known Allergies          Medications:     Current Facility-Administered Medications   Medication Dose Route Frequency Provider Last Rate Last Admin    acetaminophen (TYLENOL) tablet 650 mg  650 mg Oral Q4H PRN Charlie Scott MD        HYDROmorphone (DILAUDID) injection 0.2 mg  0.2 mg Intravenous Q2H PRN Charlie Scott MD   0.2 mg at 07/19/24 0845 " "   Or    HYDROmorphone (DILAUDID) injection 0.4 mg  0.4 mg Intravenous Q2H PRN Charlie Scott MD        naloxone (NARCAN) injection 0.2 mg  0.2 mg Intravenous Q2 Min PRN Charlie Scott MD        Or    naloxone (NARCAN) injection 0.4 mg  0.4 mg Intravenous Q2 Min PRN Charlie Scott MD        Or    naloxone (NARCAN) injection 0.2 mg  0.2 mg Intramuscular Q2 Min PRN Charlie Scott MD        Or    naloxone (NARCAN) injection 0.4 mg  0.4 mg Intramuscular Q2 Min PRN Charlie Scott MD        ondansetron (ZOFRAN ODT) ODT tab 4 mg  4 mg Oral Q6H PRN Charlie Scott MD        Or    ondansetron (ZOFRAN) injection 4 mg  4 mg Intravenous Q6H PRN Charlie Scott MD        oxyCODONE (ROXICODONE) tablet 5 mg  5 mg Oral Q4H PRN Charlie Scott MD        Or    oxyCODONE (ROXICODONE) tablet 10 mg  10 mg Oral Q4H PRN Charlie Scott MD        polyethylene glycol (MIRALAX) Packet 17 g  17 g Oral Daily PRN Charlie Scott MD        senna-docusate (SENOKOT-S/PERICOLACE) 8.6-50 MG per tablet 1-2 tablet  1-2 tablet Oral BID Charlie Scott MD        sodium chloride 0.9 % infusion  1,000 mL Intravenous Continuous Charlie Scott  mL/hr at 07/19/24 0403 1,000 mL at 07/19/24 0403             Review of Systems:   CV: NEGATIVE for chest pain, palpitations or peripheral edema  C: NEGATIVE for fever, chills, change in weight  E/M: NEGATIVE for ear, mouth and throat problems  R: NEGATIVE for significant cough or SOB          Physical Exam:   All vitals have been reviewed  Patient Vitals for the past 24 hrs:   BP Temp Temp src Pulse Resp SpO2 Height Weight   07/19/24 0700 (!) 146/85 98.4  F (36.9  C) Oral 96 18 96 % -- --   07/19/24 0359 136/88 98.3  F (36.8  C) -- 95 18 96 % -- --   07/18/24 2332 139/79 98.4  F (36.9  C) Oral -- 17 97 % -- --   07/18/24 2053 (!) 143/88 99.2  F (37.3  C) Oral 112 18 96 % 1.7 m (5' 6.93\") 66.6 kg (146 lb 13.2 oz)   07/18/24 2030 (!) 149/85 -- -- 110 19 96 % -- --   07/18/24 2000 136/79 -- -- 109 15 96 % -- --   07/18/24 1930 (!) " "142/80 -- -- 116 18 96 % -- --   07/18/24 1900 137/83 -- -- 115 17 96 % -- --   07/18/24 1853 134/86 -- -- 111 -- 96 % -- --   07/18/24 1800 (!) 147/81 -- -- 104 19 -- -- --   07/18/24 1730 (!) 142/87 -- -- 101 18 -- -- --   07/18/24 1708 -- -- -- -- -- -- 1.702 m (5' 7\") 65.8 kg (145 lb)   07/18/24 1700 (!) 149/87 -- -- 98 16 97 % -- --   07/18/24 1630 (!) 142/84 -- -- 92 17 99 % -- --   07/18/24 1515 119/66 98  F (36.7  C) Temporal 83 18 99 % -- --       Intake/Output Summary (Last 24 hours) at 7/19/2024 0939  Last data filed at 7/18/2024 1846  Gross per 24 hour   Intake 1000 ml   Output 800 ml   Net 200 ml       Constitutional: Pleasant, alert, appropriate, following commands. NAD. Non-toxic appearing.  HEENT: Head atraumatic normocephalic. Pupils equal round and reactive.  Respiratory: Unlabored breathing no audible wheeze  Cardiovascular: Regular rate and rhythm per pulses.  GI: Abdomen is non-distended.  Lymph/Hematologic: No lymphadenopathy in areas examined.  Genitourinary: No sue  Skin: No rashes, no cyanosis, no edema.  Musculoskeletal: Left upper extremity: No open wounds. Healing abrasions/friction burn to the left posterolateral upper arm. No significant skin tenting to the left clavicular region. Mild to moderate swelling of the clavicular region with early ecchymosis. No erythema. Upper arm and forearm remain soft and compressible. Tender along the distal half of the clavicle and AC joint. Nontender over the acromion, anterior and posterior GH joint lines, proximal humerus, biceps, triceps, medial and lateral epicondyles, olecranon, forearm extensors and flexors, distal radius, distal ulna, and anatomic snuff box. No attempts made to range the left shoulder, but patient is able to fire the posterior head of the deltoid. Able to actively flex, extend, supinate, and pronate the left elbow/forearm. Full AROM of the wrist and all digits. AIN and PIN intact. 5/5  strength. Radial pulse 2+. Capillary " refill brisk. SILT A/M/R/U nerve distributions.  Neurologic: GCS 15, A&OX4, normal mood and affect          Data:   All laboratory data reviewed  Results for orders placed or performed during the hospital encounter of 07/18/24   XR Shoulder Left G/E 3 Views     Status: None    Narrative    SHOULDER LEFT THREE OR MORE VIEWS   7/18/2024 4:06 PM     HISTORY:  Fall.    COMPARISON: None.      Impression    IMPRESSION:  1. There is a fracture of the distal clavicle. There is about 2 cm of  displacement. There is also probably a mild degree of bayoneting.  2. Mild osteoarthrosis of the glenohumeral joint.  3. Otherwise negative.     ASHLEY LINARES MD         SYSTEM ID:  [id number]   Humerus XR,  G/E 2 views, left     Status: None    Narrative    HUMERUS LEFT TWO OR MORE VIEWS   7/18/2024 4:05 PM     HISTORY:  Fall.    COMPARISON: None.      Impression    IMPRESSION: There is a fracture of the distal clavicle. There is 2 cm  displacement. There is also probably mild bayoneting. Mild  osteoarthrosis of the glenohumeral joint. The humerus appears normal.     ASHLEY LINARES MD         SYSTEM ID:  [id number]   CT Head w/o Contrast     Status: None    Narrative    CT SCAN OF THE HEAD WITHOUT CONTRAST   7/18/2024 3:47 PM     HISTORY: fall down ladder    TECHNIQUE:  Axial images of the head and coronal reformations without  IV contrast material. Radiation dose for this scan was reduced using  automated exposure control, adjustment of the mA and/or kV according  to patient size, or iterative reconstruction technique.    COMPARISON: None.    FINDINGS: Small focus of subarachnoid hemorrhage questionably present  in the high right frontal lobe. Recommend follow-up imaging to assess  for interval change. This is best visualized on image 22 of series 2  and image 24 of series 5. No midline shift or mass effect. Bilateral  basal ganglia mineralization. Intracranial atheromatous disease. No  acute loss of gray-white differentiation.  Mild global cortical volume  loss. Osseous calvarium is intact.      Impression    IMPRESSION:   Small focus of subarachnoid hemorrhage suspected in the  high right frontal lobe as detailed above. No mass effect or midline  shift is resolved. Recommend follow-up CT evaluation of the brain.     [Critical Result: Acute intracranial hemorrhage]    Finding was identified on 7/18/2024 3:49 PM.     JOHN GREY was contacted by Dr. Ace on 7/18/2024 3:53 PM and  verbalized understanding of the critical result.       YOBANI ACE MD         SYSTEM ID:  [id number]   CT Cervical Spine w/o Contrast     Status: None    Narrative    EXAM: CT CERVICAL SPINE W/O CONTRAST  LOCATION: [facility]  DATE: 7/18/2024    INDICATION: fall down ladder  COMPARISON: None.  TECHNIQUE: Routine CT Cervical Spine without IV contrast. Multiplanar reformats. Dose reduction techniques were used.    FINDINGS:  No evidence of acute fracture or subluxation of the cervical spine by CT imaging. The vertebral bodies of the cervical spine have normal stature and alignment. The disc spaces are well-maintained. No significant degenerative changes. No extraspinal   abnormality.     Craniovertebral junction and C1-C2: The odontoid process is well approximated with the anterior body of C1 and well aligned between the lateral masses of C1.    C2-C3: No posterior disc bulge or spinal canal narrowing. No neural foraminal narrowing.     C3-C4: No posterior disc bulge or spinal canal narrowing. No neural foraminal narrowing.     C4-C5: No posterior disc bulge or spinal canal narrowing. No neural foraminal narrowing.     C5-C6: No posterior disc bulge or spinal canal narrowing. No neural foraminal narrowing.     C6-C7: No posterior disc bulge or spinal canal narrowing. No neural foraminal narrowing.     C7-T1: No posterior disc bulge or spinal canal narrowing. No neural foraminal narrowing.       Impression    IMPRESSION:  1.  No evidence  of acute fracture or subluxation of the cervical spine by CT imaging.   XR Chest 2 Views     Status: None    Narrative    XR CHEST 2 VIEWS   7/18/2024 4:16 PM     HISTORY: fall down ladder, left shoulder pain    COMPARISON: None.      Impression    IMPRESSION: No evidence acute cardiopulmonary disease. No displaced  fractures visualized.    EDMOND HOGAN MD         SYSTEM ID:  [id number]   CT Chest/Abdomen/Pelvis w Contrast     Status: None    Narrative    EXAM: CT CHEST/ABDOMEN/PELVIS W CONTRAST  LOCATION: [facility]  DATE: 7/18/2024    INDICATION: fall off ladder, pain  COMPARISON: None.  TECHNIQUE: CT scan of the chest, abdomen, and pelvis was performed following injection of IV contrast. Multiplanar reformats were obtained. Dose reduction techniques were used.   CONTRAST: 73 mL Isovue 370    FINDINGS:   LUNGS AND PLEURA: Mild to moderate emphysema. Bibasilar atelectasis. No focal consolidation or effusion.    MEDIASTINUM/AXILLAE: Heart is normal in size. No mediastinal, axillary, or hilar adenopathy.    CORONARY ARTERY CALCIFICATION: Severe.    HEPATOBILIARY: Hepatic cysts. Mild hepatic steatosis. Gallbladder within normal limits.    PANCREAS: Normal.    SPLEEN: Normal.    ADRENAL GLANDS: Normal.    KIDNEYS/BLADDER: No significant mass, stone, or hydronephrosis.    BOWEL: No obstruction or inflammatory change.    LYMPH NODES: Normal.    VASCULATURE: Mild atherosclerotic disease of the abdominal aorta and its branches.    PELVIC ORGANS: Bladder is distended.    MUSCULOSKELETAL: 10 mm sclerotic focus within the posterior right fifth rib which is indeterminant. Degenerative changes of the spine. No fracture.      Impression    IMPRESSION:  1.  No acute findings in the chest, abdomen, and pelvis. No traumatic injury.   CT Thoracic Spine w/o Contrast     Status: None    Narrative    EXAM: CT THORACIC SPINE W/O CONTRAST  LOCATION: [facility]  DATE:  7/18/2024    INDICATION: fall from height  COMPARISON: None.  TECHNIQUE: Routine CT Thoracic Spine without IV contrast. Multiplanar reformats. Dose reduction techniques were used.     FINDINGS:  VERTEBRA: No acute fracture. No acute post traumatic subluxation. Vertebral body heights within normal limits. Diffuse bony demineralization.    CANAL/FORAMINA: Mild exaggerated thoracic kyphosis. Multilevel spondylosis. No significant central canal stenosis.  No significant neuroforaminal stenosis. No significant subluxations.    PARASPINAL: Emphysema. Right thyroid lobe nodule measuring 1.9 cm. Recommend nonemergent thyroid ultrasound.     Liver demonstrates several low-density lesions. These defer to concomitant CT abdomen pelvis.      Impression      IMPRESSION:  1.  No acute fracture.    2.  Chronic spine changes described above.    3.  Right thyroid nodule. Recommend nonemergent thyroid ultrasound.     CT Lumbar Spine w/o Contrast     Status: None    Narrative    EXAM: CT LUMBAR SPINE W/O CONTRAST  LOCATION: [facility]  DATE: 7/18/2024    INDICATION: fall off ladder  COMPARISON: None.  TECHNIQUE: Routine CT Lumbar Spine without IV contrast. Multiplanar reformats. Dose reduction techniques were used.     FINDINGS:  VERTEBRA:   Right L3 pars age-indeterminate fracture.    Otherwise, no acute fracture.  No acute post traumatic subluxations.   Normal vertebral body heights. Possible diffuse bony demineralization.    CANAL/FORAMINA: Multilevel spondylosis. Multiple bulges. No significant central canal stenosis.  Various levels and degrees of neural foraminal stenosis.  No significant subluxations. Bilateral SI degenerative joint disease.    PARASPINAL: Liver caudate lobe 2.2 cm low-density lesion. Please defer to concomitant CT abdomen pelvis.    Bladder is distended into the lower abdomen.      Impression     IMPRESSION:  1.  Right L3 pars age-indeterminate fracture.    2.  Otherwise, no acute  fracture.     3.  Chronic spine findings described above.   Comprehensive metabolic panel     Status: Abnormal   Result Value Ref Range    Sodium 133 (L) 135 - 145 mmol/L    Potassium 3.3 (L) 3.4 - 5.3 mmol/L    Carbon Dioxide (CO2) 22 22 - 29 mmol/L    Anion Gap 17 (H) 7 - 15 mmol/L    Urea Nitrogen 9.1 8.0 - 23.0 mg/dL    Creatinine 0.60 (L) 0.67 - 1.17 mg/dL    GFR Estimate >90 >60 mL/min/1.73m2    Calcium 9.1 8.8 - 10.4 mg/dL    Chloride 94 (L) 98 - 107 mmol/L    Glucose 127 (H) 70 - 99 mg/dL    Alkaline Phosphatase 87 40 - 150 U/L    AST 22 0 - 45 U/L    ALT 11 0 - 70 U/L    Protein Total 7.8 6.4 - 8.3 g/dL    Albumin 4.8 3.5 - 5.2 g/dL    Bilirubin Total 0.4 <=1.2 mg/dL   INR     Status: Normal   Result Value Ref Range    INR 0.96 0.85 - 1.15   CBC with platelets and differential     Status: Abnormal   Result Value Ref Range    WBC Count 17.0 (H) 4.0 - 11.0 10e3/uL    RBC Count 4.52 4.40 - 5.90 10e6/uL    Hemoglobin 14.8 13.3 - 17.7 g/dL    Hematocrit 40.2 40.0 - 53.0 %    MCV 89 78 - 100 fL    MCH 32.7 26.5 - 33.0 pg    MCHC 36.8 (H) 31.5 - 36.5 g/dL    RDW 11.6 10.0 - 15.0 %    Platelet Count 241 150 - 450 10e3/uL    % Neutrophils 78 %    % Lymphocytes 15 %    % Monocytes 5 %    % Eosinophils 1 %    % Basophils 0 %    % Immature Granulocytes 1 %    NRBCs per 100 WBC 0 <1 /100    Absolute Neutrophils 13.2 (H) 1.6 - 8.3 10e3/uL    Absolute Lymphocytes 2.6 0.8 - 5.3 10e3/uL    Absolute Monocytes 0.9 0.0 - 1.3 10e3/uL    Absolute Eosinophils 0.1 0.0 - 0.7 10e3/uL    Absolute Basophils 0.1 0.0 - 0.2 10e3/uL    Absolute Immature Granulocytes 0.1 <=0.4 10e3/uL    Absolute NRBCs 0.0 10e3/uL   iStat Gases (lactate) venous, POCT     Status: Abnormal   Result Value Ref Range    Lactic Acid POCT 4.7 (HH) <=2.0 mmol/L    Bicarbonate Venous POCT 21 21 - 28 mmol/L    O2 Sat, Venous POCT 68 (L) 70 - 75 %    pCO2 Venous POCT 40 40 - 50 mm Hg    pH Venous POCT 7.32 7.32 - 7.43    pO2 Venous POCT 38 25 - 47 mm Hg    Base  Excess/Deficit (+/-) POCT -5.0 (L) -3.0 - 3.0 mmol/L   Lactic acid whole blood     Status: Abnormal   Result Value Ref Range    Lactic Acid 4.4 (HH) 0.7 - 2.0 mmol/L   Lactic acid whole blood     Status: Normal   Result Value Ref Range    Lactic Acid 1.4 0.7 - 2.0 mmol/L   Basic metabolic panel     Status: Abnormal   Result Value Ref Range    Sodium 133 (L) 135 - 145 mmol/L    Potassium 3.5 3.4 - 5.3 mmol/L    Chloride 100 98 - 107 mmol/L    Carbon Dioxide (CO2) 22 22 - 29 mmol/L    Anion Gap 11 7 - 15 mmol/L    Urea Nitrogen 4.8 (L) 8.0 - 23.0 mg/dL    Creatinine 0.49 (L) 0.67 - 1.17 mg/dL    GFR Estimate >90 >60 mL/min/1.73m2    Calcium 8.6 (L) 8.8 - 10.4 mg/dL    Glucose 155 (H) 70 - 99 mg/dL   CBC with platelets     Status: Abnormal   Result Value Ref Range    WBC Count 11.9 (H) 4.0 - 11.0 10e3/uL    RBC Count 4.44 4.40 - 5.90 10e6/uL    Hemoglobin 14.4 13.3 - 17.7 g/dL    Hematocrit 39.1 (L) 40.0 - 53.0 %    MCV 88 78 - 100 fL    MCH 32.4 26.5 - 33.0 pg    MCHC 36.8 (H) 31.5 - 36.5 g/dL    RDW 11.6 10.0 - 15.0 %    Platelet Count 224 150 - 450 10e3/uL   EKG 12-lead, tracing only     Status: None   Result Value Ref Range    Systolic Blood Pressure  mmHg    Diastolic Blood Pressure  mmHg    Ventricular Rate 109 BPM    Atrial Rate 109 BPM    GA Interval 148 ms    QRS Duration 84 ms     ms    QTc 457 ms    P Axis 54 degrees    R AXIS 61 degrees    T Axis 46 degrees    Interpretation ECG       Sinus tachycardia  Nonspecific ST and T wave abnormality  Abnormal ECG  No previous ECGs available  Confirmed by GENERATED REPORT, COMPUTER ([id number]),  Aasen, Bradley ([id number]) on 7/18/2024 7:13:19 PM     Adult Type and Screen     Status: None   Result Value Ref Range    ABO/RH(D) B POS     Antibody Screen Negative Negative    SPECIMEN EXPIRATION DATE [id number]    Blood Culture Peripheral Blood     Status: Normal (Preliminary result)    Specimen: Peripheral Blood   Result Value Ref Range    Culture No growth after  12 hours    Blood Culture Peripheral Blood     Status: Normal (Preliminary result)    Specimen: Peripheral Blood   Result Value Ref Range    Culture No growth after 12 hours    CBC with platelets differential     Status: Abnormal    Narrative    The following orders were created for panel order CBC with platelets differential.  Procedure                               Abnormality         Status                     ---------                               -----------         ------                     CBC with platelets and d...[[id number]]  Abnormal            Final result                 Please view results for these tests on the individual orders.   ABO/Rh type and screen     Status: None    Narrative    The following orders were created for panel order ABO/Rh type and screen.  Procedure                               Abnormality         Status                     ---------                               -----------         ------                     Adult Type and Screen[[id number]]                            Final result                 Please view results for these tests on the individual orders.          Attestation:  I have reviewed today's vital signs, notes, medications, labs and imaging with Dr. Burton Vega.  Amount of time performed on this consult: 50 minutes.    Harmony Ramirez PA-C  [facility]

## 2024-07-19 NOTE — PROGRESS NOTES
Neurosurgery progress note    Patient feeling well. Denies headache.     Head CT showed   IMPRESSION: No acute intracranial hemorrhage or other acute  abnormality. Previously questioned subarachnoid hemorrhage is favored  to reflect a cortical vein and possible developmental venous anomaly.    Exam    Alert and oriented no acute distress  Bilateral upper extremities with appropriate strength  Finger-nose slow and accurate  Negative pronator drift  Extraocular movements intact    Assessment    Ambrocio Ojeda is a 64 year old male who was admitted on 7/18/2024. Ambrocio Ojeda is a 64 year old male NOT anticoagulated who presents after fall from 10 ft ladder witih shoulder pain and head pain and imaging evidence small focus SAH high right frontal lobe, subsequent head CT scan demonstrated that was likely a cortical vein or venous anomaly..    Plan    Head CT scan demonstrated no acute intracranial hemorrhage, no further follow-up or evaluation needed with neurosurgery.  Will sign off

## 2024-07-19 NOTE — PLAN OF CARE
Patient discharged. Discontinued IV. Discharge paperwork reviewed, patient indicates understanding of all follow up instructions and appointments. All questions answered. Patient indicates understanding of medication schedule. Patient states they have all belongings. Pt family here to drive them home.

## 2024-07-19 NOTE — PROGRESS NOTES
"   07/19/24 1119   Appointment Info   Signing Clinician's Name / Credentials (SLP) Lashon Campa MS, CCC-SLP   General Information   Onset of Illness/Injury or Date of Surgery 07/18/24   Referring Physician Shweta Moncada PA-C   Pertinent History of Current Problem  Per ortho consult, \"Medical history obtained via chart review and discussion with the patient. Ambrocio Ojeda is a 64 year old right-hand dominant male with past medical history of T2DM, HTN, and chronic back pain who was admitted on 7/18/24 for a subarachnoid hemorrhage and left clavicle fracture. On 7/18/24, the patient fell approximately 10 feet from a ladder while trimming branches onto his left side. He claims that he did not hit his head and denies LOC. Patient noted immediate onset of pain to the left shoulder region. He also states that the area had a \"large bump\" but denies any open wounds to the clavicle. He did sustained some abrasions to the left upper arm. The patient was able to get up on his own after the incident. Patient was taken to [facility] ED where head CT was consistent with a small SAH of the high right frontal lobe and radiographs of the left shoulder demonstrated a distal clavicle fracture with evidence of CC ligament injury.\"    General Observations Pt awake/alert, cooperative.   Type of Evaluation   Type of Evaluation Swallow Evaluation   Oral Motor   Mucosal Quality good   Dentition (Oral Motor)   Dentition (Oral Motor) natural dentition;adequate dentition   Facial Symmetry (Oral Motor)   Facial Symmetry (Oral Motor) left side impairment   Left Side Facial Asymmetry minimal impairment   Lip Function (Oral Motor)   Lip Range of Motion (Oral Motor) WNL   Lip Strength (Oral Motor) WNL   Tongue Function (Oral Motor)   Tongue Strength (Oral Motor) WNL   Tongue ROM (Oral Motor) WNL   Jaw Function (Oral Motor)   Jaw Function (Oral Motor) WNL   Cough/Swallow/Gag Reflex (Oral Motor)   Volitional Throat Clear/Cough (Oral Motor) WNL "   Vocal Quality/Secretion Management (Oral Motor)   Vocal Quality (Oral Motor) WNL   General Swallowing Observations   Past History of Dysphagia None per pt/chart   Respiratory Support room air   Current Diet/Method of Nutritional Intake (General Swallowing Observations, NIS) NPO   Swallowing Evaluation Clinical swallow evaluation   Clinical Swallow Evaluation   Clinical Swallow Evaluation Textures Trialed thin liquids;pureed;solid foods   Clinical Swallow Eval: Thin Liquid Texture Trial   Mode of Presentation, Thin Liquids spoon;cup;straw   Oral Phase of Swallow WFL   Pharyngeal Phase of Swallow intact   Clinical Swallow Evaluation: Puree Solid Texture Trial   Oral Phase, Puree WFL   Pharyngeal Phase, Puree intact   Clinical Swallow Evaluation: Solid Food Texture Trial   Oral Phase WFL   Pharyngeal Phase intact   Swallowing Recommendations   Diet Consistency Recommendations regular diet;thin liquids (level 0)   Supervision Level for Intake patient independent   Mode of Delivery Recommendations bolus size, small;slow rate of intake   Swallowing Maneuver Recommendations alternate food and liquid intake   Recommended Feeding/Eating Techniques (Swallow Eval) maintain upright sitting position for eating   Medication Administration Recommendations, Swallowing (SLP) whole with liquid   Instrumental Assessment Recommendations instrumental evaluation not recommended at this time   Clinical Impression   Criteria for Skilled Therapeutic Interventions Met (SLP Eval) Evaluation only;No problems identified which require skilled intervention   SLP Diagnosis clinically functional swallow   Clinical Impression Comments Pt currently presents with clinically functional swallow. No difficulty or s/s aspiration observed across PO trials.   SLP Total Evaluation Time   Eval: oral/pharyngeal swallow function, clinical swallow Minutes (08032) 12   SLP Discharge Planning   SLP Plan Discontinue SLP   SLP Discharge Recommendation home   SLP  "Rationale for DC Rec No further SLP needs at this time   SLP Brief overview of current status  Recommend regular diet with thin liquid and standard precautions. Discontinue IP SLP. Pt denying changes to cognition at this time, stating he feels \"normal.\" Please re-consult if need arises; may consider OP SLP evaluation if changes are noted following discharge.       "

## 2024-07-19 NOTE — H&P
General Surgery H&P    Ambrocio Ojeda MRN#: [id number]   Age: 64 year old YOB: 1960       Date of Admission:          7/18/2024       Assessment:   64 year old male with PMH of T2DM and HLD who presented on 7/18/2024 after fall from 10 ft ladder.  He was found to have a clavicle fracture and small SAH.         Plan:   -- Repeat head CT today  -- Orthopedics and neurosurgery consulted, appreciate recs  ___________________________________________________________________         Chief Complaint:     Chief Complaint   Patient presents with    Fall          History of Present Illness:   64 year old male with PMHx HLD, T2DM who presented after fall off of 10 ft. Ladder while trimming a tree.  He reports he fell on his left side and hit his shoulder.  He did not hit his head or lose consciousness.  Patient was carnes-scanned in the ED and was found to have a left clavicle fracture and a small focus of SAH in high right frontal lobe.  Patient is neurointact and only has pain in his left shoulder.         Past Medical History:   No past medical history on file.       Past Surgical History:   No past surgical history on file.         Medications:     Prior to Admission medications    Medication Sig Start Date End Date Taking? Authorizing Provider   amLODIPine (NORVASC) 10 MG tablet Take 10 mg by mouth daily   Yes Unknown, Entered By History   atorvastatin (LIPITOR) 20 MG tablet Take 20 mg by mouth daily   Yes Unknown, Entered By History   glipiZIDE (GLUCOTROL XL) 10 MG 24 hr tablet Take 10 mg by mouth daily   Yes Unknown, Entered By History   losartan (COZAAR) 100 MG tablet Take 100 mg by mouth daily   Yes Unknown, Entered By History   metFORMIN (GLUCOPHAGE) 1000 MG tablet Take 1,000 mg by mouth 2 times daily (with meals)   Yes Unknown, Entered By History          Current Medications:         Current Facility-Administered Medications   Medication Dose Route Frequency Provider Last Rate Last Admin    senna-docusate  "(SENOKOT-S/PERICOLACE) 8.6-50 MG per tablet 1-2 tablet  1-2 tablet Oral BID Charlie Scott MD         Current Facility-Administered Medications   Medication Dose Route Frequency Provider Last Rate Last Admin    acetaminophen (TYLENOL) tablet 650 mg  650 mg Oral Q4H PRN Charlie Scott MD   650 mg at 07/19/24 1150    HYDROmorphone (DILAUDID) injection 0.2 mg  0.2 mg Intravenous Q2H PRN Charlie Scott MD   0.2 mg at 07/19/24 0845    Or    HYDROmorphone (DILAUDID) injection 0.4 mg  0.4 mg Intravenous Q2H PRN Charlie Scott MD        naloxone (NARCAN) injection 0.2 mg  0.2 mg Intravenous Q2 Min PRN Charlie Scott MD        Or    naloxone (NARCAN) injection 0.4 mg  0.4 mg Intravenous Q2 Min PRN Charlie Scott MD        Or    naloxone (NARCAN) injection 0.2 mg  0.2 mg Intramuscular Q2 Min PRN Charlie Scott MD        Or    naloxone (NARCAN) injection 0.4 mg  0.4 mg Intramuscular Q2 Min PRN Charlie Scott MD        ondansetron (ZOFRAN ODT) ODT tab 4 mg  4 mg Oral Q6H PRN Charlie Scott MD        Or    ondansetron (ZOFRAN) injection 4 mg  4 mg Intravenous Q6H PRN Charlie Scott MD        oxyCODONE (ROXICODONE) tablet 5 mg  5 mg Oral Q4H PRN Charlie Scott MD        Or    oxyCODONE (ROXICODONE) tablet 10 mg  10 mg Oral Q4H PRN Charlie Scott MD        polyethylene glycol (MIRALAX) Packet 17 g  17 g Oral Daily PRN Charlie Scott MD                Allergies:   No Known Allergies       Social History:     Social History     Tobacco Use    Smoking status: Not on file    Smokeless tobacco: Not on file   Substance Use Topics    Alcohol use: Not on file          Family History:   No family history on file.          Review of Systems:   The 12 point Review of Systems is negative other than noted in the HPI.         Physical Exam:   Blood pressure (!) 159/91, pulse 96, temperature 98.5  F (36.9  C), temperature source Oral, resp. rate 18, height 1.7 m (5' 6.93\"), weight 66.6 kg (146 lb 13.2 oz), SpO2 96%.  I/O last 3 completed shifts:  In: 1000 " [IV Piggyback:1000]  Out: 800 [Urine:800]  General: male of stated age, no acute distress  HEENT: Normocephalic. Anecteric. Moist mucous membranes. Pupils equal  Neck: Supple without masses or lymphadenopathy  Lungs: Non-labored breathing on room air  Heart: Regular rate & rhythm   Abdomen: Soft, non-tender, non-distended  Back: no tenderness  Extremities: Moves all extremities. No edema. Some pain in left shoulder  Neurologic: No focal deficits   Skin: Warm and dry         Data:   Labs:  Recent Labs   Lab Test 07/19/24  0758 07/18/24  1632   WBC 11.9* 17.0*   HGB 14.4 14.8   HCT 39.1* 40.2    241     Recent Labs   Lab Test 07/19/24  0758 07/18/24  1632   POTASSIUM 3.5 3.3*   CHLORIDE 100 94*   CO2 22 22   BUN 4.8* 9.1   CR 0.49* 0.60*   * 127*     Recent Labs   Lab Test 07/18/24  1632   BILITOTAL 0.4   ALT 11   AST 22   ALKPHOS 87     Recent Labs   Lab Test 07/18/24  1632   INR 0.96       CT scan c spine:  No acute traumatic injuries  CT scan T spine:  No acute traumatic injuries. R thyroid nodule  CT scan L spine:  R L3 pars age-indeterminate fracture  CT CAP:  No acute findings  CT Head:  Small focus of SAH in right frontal lobe  XR L shoulder and humerus:  Distal clavicle fracture with 2 cm displacement    I have discussed the history, physical, and plan with Dr. Mcnair, who has independently interviewed and examined the patient and agrees with the plan as stated.     Aline Garcia DO on 7/19/2024 at 12:23 PM  General Surgery Resident PGY4

## 2024-07-19 NOTE — PLAN OF CARE
Orthopedic Plan of Care    I spoke with Shweta Moncada PA-C with neurosurgery. According to this morning's repeat head CT, there is no sign of acute intracranial abnormality. Per neurosurgery, patient's discharge is deferred to the primary/trauma team. If there are plans for the patient to discharge today or this weekend, he can follow up with an orthopedic shoulder specialist as an outpatient as his closed left clavicle fracture with coracoclavicular ligament injury alone do not require prolonged hospitalization. It was the patient's preference this morning that he discharge as soon as possible.     I spoke with the patient's RN and she will relay the above information of recommending outpatient follow up to the patient. If there are any questions or concerns, or if the patient does not discharge within the next couple of days, please contact the orthopedic service. Otherwise, patient should continue with plan per ortho consult note from earlier today. Discharge orders updated.    Maria Del Carmen Ramirez PA-C  [facility]

## 2024-07-19 NOTE — PLAN OF CARE
Pt here with Fall and L clavicle fracture and small R frontal SAH. A&Ox4. Neuros intact ex slight L mouth droop and limited ROM LUE r/t clavicle fracture. SBP<150. Regular diet, thin liquids. Takes pills whole. Up with SBA. L shoulder pain 4-6/10, PRN dilaudid given. Pt scoring green on the Aggression Stop Light Tool. Plan repeat CT this AM, ST consult. Discharge pending.  Patient educated on watching for swelling on L hand and to take L ring off if any swelling. Patient aware and is currently able to remove his ring from finger with ease.

## 2024-07-19 NOTE — PLAN OF CARE
Reason for Admission: Fall, L clavicle fracture, small R frontal SAH.     Cognitive/Mentation: A/O x4.   Neuros/CMS: Intact ex L droop and limited mobility in LUE d/t clavicle fracture.   VS: Stable on RA. SBP <150.   Tele: N/A.  GI/: Continent.   Pulmonary: LS clear.   Pain: in L shoulder d/t clavicle fracture.     Drains/Lines: PIV SL.   Skin: Intact.  Activity: SBA.   Diet: NPO overnight d/t L droop.     Therapies recs: pending.   Discharge: pending.     Aggression Spotlight Tool: green.     End of shift summary: Plan for repeat head CT today, if stable, follow up in 1 month. Ortho surg and Neurosurg following.

## 2024-07-23 LAB
BACTERIA BLD CULT: NO GROWTH
BACTERIA BLD CULT: NO GROWTH